# Patient Record
Sex: FEMALE | Race: BLACK OR AFRICAN AMERICAN | Employment: FULL TIME | URBAN - METROPOLITAN AREA
[De-identification: names, ages, dates, MRNs, and addresses within clinical notes are randomized per-mention and may not be internally consistent; named-entity substitution may affect disease eponyms.]

---

## 2018-04-25 ENCOUNTER — HOSPITAL ENCOUNTER (EMERGENCY)
Age: 48
Discharge: HOME | End: 2018-04-25
Attending: EMERGENCY MEDICINE

## 2018-04-25 VITALS
RESPIRATION RATE: 20 BRPM | WEIGHT: 156 LBS | TEMPERATURE: 98.2 F | SYSTOLIC BLOOD PRESSURE: 133 MMHG | HEART RATE: 81 BPM | BODY MASS INDEX: 25.96 KG/M2 | DIASTOLIC BLOOD PRESSURE: 95 MMHG | OXYGEN SATURATION: 97 %

## 2018-04-25 DIAGNOSIS — J01.90 ACUTE NON-RECURRENT SINUSITIS, UNSPECIFIED LOCATION: Primary | ICD-10-CM

## 2018-04-25 DIAGNOSIS — R05.9 COUGH: ICD-10-CM

## 2018-04-25 PROCEDURE — 99282 EMERGENCY DEPT VISIT SF MDM: CPT

## 2018-04-25 RX ORDER — AMOXICILLIN AND CLAVULANATE POTASSIUM 875; 125 MG/1; MG/1
1 TABLET, FILM COATED ORAL 2 TIMES DAILY
Qty: 14 TABLET | Refills: 0 | Status: SHIPPED | OUTPATIENT
Start: 2018-04-25 | End: 2018-05-02

## 2018-04-25 RX ORDER — ONDANSETRON 4 MG/1
4 TABLET, ORALLY DISINTEGRATING ORAL EVERY 8 HOURS PRN
Qty: 10 TABLET | Refills: 0 | Status: SHIPPED | OUTPATIENT
Start: 2018-04-25 | End: 2019-02-15 | Stop reason: ALTCHOICE

## 2018-04-25 RX ORDER — GUAIFENESIN AND CODEINE PHOSPHATE 100; 10 MG/5ML; MG/5ML
5 SOLUTION ORAL 3 TIMES DAILY PRN
Qty: 120 ML | Refills: 0 | Status: SHIPPED | OUTPATIENT
Start: 2018-04-25 | End: 2018-05-02

## 2018-04-25 RX ORDER — CETIRIZINE HYDROCHLORIDE, PSEUDOEPHEDRINE HYDROCHLORIDE 5; 120 MG/1; MG/1
1 TABLET, FILM COATED, EXTENDED RELEASE ORAL 2 TIMES DAILY
Qty: 24 TABLET | Refills: 0 | Status: SHIPPED | OUTPATIENT
Start: 2018-04-25 | End: 2018-05-25

## 2018-04-25 ASSESSMENT — PAIN DESCRIPTION - PAIN TYPE: TYPE: ACUTE PAIN

## 2018-04-25 ASSESSMENT — PAIN DESCRIPTION - ORIENTATION: ORIENTATION: ANTERIOR

## 2018-04-25 ASSESSMENT — PAIN DESCRIPTION - FREQUENCY: FREQUENCY: CONTINUOUS

## 2018-04-25 ASSESSMENT — PAIN SCALES - GENERAL: PAINLEVEL_OUTOF10: 4

## 2018-04-25 ASSESSMENT — PAIN DESCRIPTION - ONSET: ONSET: AWAKENED FROM SLEEP

## 2018-04-25 ASSESSMENT — PAIN DESCRIPTION - LOCATION: LOCATION: HEAD

## 2018-04-25 ASSESSMENT — PAIN DESCRIPTION - PROGRESSION
CLINICAL_PROGRESSION: NOT CHANGED
CLINICAL_PROGRESSION: NOT CHANGED

## 2018-04-25 ASSESSMENT — PAIN DESCRIPTION - DESCRIPTORS: DESCRIPTORS: ACHING

## 2018-04-27 ASSESSMENT — ENCOUNTER SYMPTOMS
BACK PAIN: 0
BLOOD IN STOOL: 0
ABDOMINAL PAIN: 0
COUGH: 1
VOMITING: 1
DIARRHEA: 0
SHORTNESS OF BREATH: 0
NAUSEA: 1

## 2018-08-30 ENCOUNTER — OFFICE VISIT (OUTPATIENT)
Dept: SURGERY | Age: 48
End: 2018-08-30

## 2018-08-30 VITALS
HEIGHT: 66 IN | OXYGEN SATURATION: 98 % | HEART RATE: 68 BPM | DIASTOLIC BLOOD PRESSURE: 80 MMHG | SYSTOLIC BLOOD PRESSURE: 120 MMHG | BODY MASS INDEX: 25.07 KG/M2 | WEIGHT: 156 LBS

## 2018-08-30 DIAGNOSIS — R23.8 FACIAL AGING: Primary | ICD-10-CM

## 2018-08-30 PROCEDURE — 99999 PR OFFICE/OUTPT VISIT,PROCEDURE ONLY: CPT | Performed by: PLASTIC SURGERY

## 2018-08-30 NOTE — PROGRESS NOTES
may choose to sleep in an upright position the first night after the treatment.  If an antiviral was prescribed for you, continue to take as directed.  Post treatment discomfort may be relieved by oral pain relievers; i.e. Extra Strength Tylenol.  A cold compress or an ice pack can be used to provide comfort if the treated area is especially warm. This is typically only within the first 12 hours after the treatment.  Cleanse the skin two times a day with plain, lukewarm water and a gentle cleanser; (i.e. Cetaphil) beginning the morning after the treatment. Use your hands to gently apply the cleanser and water and finish by patting dry with a soft cloth. Be careful not to rub the treated area.  After cleansing your face, reapply the occlusive barrier, (i.e. Aquaphor) taking care to cover all treated areas. The occlusive barrier is needed to provide a protective barrier that will hold moisture in the skin and provide protection to the skin from pollutants in the air as the skin heals. As a rule of thumb, the occlusive barrier is needed 1 day per 10 microns of skin or once skin has re-epithielized. For example: a 20 micron MLP = 2 days of wearing the occlusive barrier. Reapply the occlusive barrier as needed. Do not allow the treated area to dry out.  Peeling and flaking generally occur within 24 hours post treatment and should be allowed to come off naturally. DO NOT PICK, RUB, OR FORCE OFF ANY SKIN DURING THE HEALING PROCESS. THIS COULD RESULT IN SCARRING AND INFECTION! Gently washing the skin more frequently will help to promote the peeling process.  Avoid direct sunlight for up to 2 months post treatment.  Once skin has healed ( no longer wearing the occlusive barrier) you may begin to wear makeup. If you are able to wear makeup, a sunblock should be worn on a daily basis to help prevent and hyperpigmentation issues that could be caused by direct and indirect sunlight.    When showering, be sure to wash your hair behind you to avoid getting shampoo directly on the treated area.  Avoid strenuous exercise and sweating until after skin has healed. Warning: There may be some degree of swelling immediately post treatment; however, if you have excessive swelling or any of the following signs of infection, you should contact the office immediately. Signs of infection include:   Drainage-looks like pus    Increased warmth at or around the treated area   Fever of 101.5 or greater      Follow-up 2 weeks for possible laser scar revision if patient's postauricular wound is well-healed without signs of hypertrophy or keloid formation    I attest that the patient was seen and examined by me, and concur with the documentation above. I agree with the assessment and the plan outlined. This document is generated, in part, by voice recognition software and thus  syntax and grammatical errors are possible.     Fatou Ba  10:42 AM  8/30/2018

## 2019-02-15 ENCOUNTER — HOSPITAL ENCOUNTER (EMERGENCY)
Age: 49
Discharge: HOME | End: 2019-02-15
Attending: EMERGENCY MEDICINE
Payer: COMMERCIAL

## 2019-02-15 VITALS
WEIGHT: 156 LBS | OXYGEN SATURATION: 98 % | SYSTOLIC BLOOD PRESSURE: 129 MMHG | BODY MASS INDEX: 25.99 KG/M2 | RESPIRATION RATE: 18 BRPM | DIASTOLIC BLOOD PRESSURE: 89 MMHG | HEIGHT: 65 IN | TEMPERATURE: 98 F | HEART RATE: 88 BPM

## 2019-02-15 DIAGNOSIS — R11.11 NON-INTRACTABLE VOMITING WITHOUT NAUSEA, UNSPECIFIED VOMITING TYPE: ICD-10-CM

## 2019-02-15 DIAGNOSIS — J11.1 FLU: Primary | ICD-10-CM

## 2019-02-15 PROCEDURE — 99283 EMERGENCY DEPT VISIT LOW MDM: CPT

## 2019-02-15 RX ORDER — ONDANSETRON 4 MG/1
4 TABLET, ORALLY DISINTEGRATING ORAL EVERY 8 HOURS PRN
Qty: 4 TABLET | Refills: 0 | Status: SHIPPED | OUTPATIENT
Start: 2019-02-15 | End: 2019-12-30

## 2019-02-15 RX ORDER — BROMPHENIRAMINE MALEATE, PSEUDOEPHEDRINE HYDROCHLORIDE, AND DEXTROMETHORPHAN HYDROBROMIDE 2; 30; 10 MG/5ML; MG/5ML; MG/5ML
5 SYRUP ORAL 4 TIMES DAILY PRN
Qty: 120 ML | Refills: 0 | Status: SHIPPED | OUTPATIENT
Start: 2019-02-15 | End: 2019-02-25

## 2019-02-15 ASSESSMENT — ENCOUNTER SYMPTOMS
COUGH: 1
SORE THROAT: 1
EYES NEGATIVE: 1
RHINORRHEA: 1
VOMITING: 1
NAUSEA: 1

## 2019-12-30 ENCOUNTER — HOSPITAL ENCOUNTER (EMERGENCY)
Age: 49
Discharge: HOME | End: 2019-12-30
Attending: EMERGENCY MEDICINE
Payer: COMMERCIAL

## 2019-12-30 VITALS
HEIGHT: 65 IN | HEART RATE: 66 BPM | TEMPERATURE: 97.8 F | OXYGEN SATURATION: 98 % | WEIGHT: 145 LBS | RESPIRATION RATE: 16 BRPM | DIASTOLIC BLOOD PRESSURE: 91 MMHG | SYSTOLIC BLOOD PRESSURE: 129 MMHG | BODY MASS INDEX: 24.16 KG/M2

## 2019-12-30 PROCEDURE — G0381 LEV 2 HOSP TYPE B ED VISIT: HCPCS

## 2019-12-30 RX ORDER — AZITHROMYCIN 250 MG/1
TABLET, FILM COATED ORAL
Qty: 1 PACKET | Refills: 0 | Status: SHIPPED | OUTPATIENT
Start: 2019-12-30 | End: 2020-01-09

## 2019-12-30 RX ORDER — BROMPHENIRAMINE MALEATE, PSEUDOEPHEDRINE HYDROCHLORIDE, AND DEXTROMETHORPHAN HYDROBROMIDE 2; 30; 10 MG/5ML; MG/5ML; MG/5ML
5 SYRUP ORAL 4 TIMES DAILY PRN
Qty: 120 ML | Refills: 0 | Status: SHIPPED | OUTPATIENT
Start: 2019-12-30 | End: 2020-06-03

## 2019-12-30 ASSESSMENT — ENCOUNTER SYMPTOMS
SHORTNESS OF BREATH: 0
EYE REDNESS: 0
ABDOMINAL DISTENTION: 0
DIARRHEA: 0
COUGH: 1
EYE PAIN: 0
NAUSEA: 0
EYE DISCHARGE: 0
WHEEZING: 0
SORE THROAT: 0
VOMITING: 0
BACK PAIN: 0
SINUS PRESSURE: 0
RHINORRHEA: 1

## 2019-12-30 NOTE — ED PROVIDER NOTES
The history is provided by the patient. URI   Presenting symptoms: congestion, cough, fatigue and rhinorrhea    Presenting symptoms: no ear pain, no fever and no sore throat    Severity:  Moderate  Onset quality:  Gradual  Duration:  4 days  Progression:  Worsening  Chronicity:  New  Associated symptoms: no arthralgias, no headaches and no wheezing         Review of Systems   Constitutional: Positive for fatigue. Negative for chills and fever. HENT: Positive for congestion and rhinorrhea. Negative for ear pain, sinus pressure and sore throat. Eyes: Negative for pain, discharge and redness. Respiratory: Positive for cough. Negative for shortness of breath and wheezing. Cardiovascular: Negative for chest pain. Gastrointestinal: Negative for abdominal distention, diarrhea, nausea and vomiting. Genitourinary: Negative for dysuria and frequency. Musculoskeletal: Negative for arthralgias and back pain. Skin: Negative for rash and wound. Neurological: Negative for weakness and headaches. Hematological: Negative for adenopathy. All other systems reviewed and are negative. Physical Exam  Vitals signs and nursing note reviewed. Constitutional:       Appearance: She is well-developed. HENT:      Head: Normocephalic and atraumatic. Right Ear: Tympanic membrane normal.      Left Ear: Tympanic membrane normal.      Nose: Mucosal edema and congestion present. Eyes:      Pupils: Pupils are equal, round, and reactive to light. Neck:      Musculoskeletal: Normal range of motion and neck supple. Cardiovascular:      Rate and Rhythm: Normal rate and regular rhythm. Heart sounds: Normal heart sounds. No murmur. Pulmonary:      Effort: Pulmonary effort is normal. No respiratory distress. Breath sounds: Rhonchi present. No wheezing or rales. Abdominal:      General: Bowel sounds are normal.      Palpations: Abdomen is soft. Tenderness: There is no tenderness.  There is no

## 2020-06-03 ENCOUNTER — HOSPITAL ENCOUNTER (EMERGENCY)
Age: 50
Discharge: HOME | End: 2020-06-03
Attending: EMERGENCY MEDICINE
Payer: COMMERCIAL

## 2020-06-03 VITALS
DIASTOLIC BLOOD PRESSURE: 88 MMHG | TEMPERATURE: 97.2 F | RESPIRATION RATE: 16 BRPM | HEART RATE: 78 BPM | SYSTOLIC BLOOD PRESSURE: 128 MMHG | OXYGEN SATURATION: 98 % | HEIGHT: 66 IN | WEIGHT: 140 LBS | BODY MASS INDEX: 22.5 KG/M2

## 2020-06-03 PROCEDURE — 6360000002 HC RX W HCPCS: Performed by: EMERGENCY MEDICINE

## 2020-06-03 PROCEDURE — 96372 THER/PROPH/DIAG INJ SC/IM: CPT

## 2020-06-03 PROCEDURE — G0382 LEV 3 HOSP TYPE B ED VISIT: HCPCS

## 2020-06-03 RX ORDER — KETOROLAC TROMETHAMINE 30 MG/ML
30 INJECTION, SOLUTION INTRAMUSCULAR; INTRAVENOUS ONCE
Status: COMPLETED | OUTPATIENT
Start: 2020-06-03 | End: 2020-06-03

## 2020-06-03 RX ORDER — IBUPROFEN 200 MG
400 TABLET ORAL EVERY 6 HOURS PRN
COMMUNITY
End: 2020-06-03

## 2020-06-03 RX ORDER — CYCLOBENZAPRINE HCL 10 MG
10 TABLET ORAL 3 TIMES DAILY PRN
Qty: 15 TABLET | Refills: 0 | Status: SHIPPED | OUTPATIENT
Start: 2020-06-03 | End: 2020-06-08

## 2020-06-03 RX ORDER — DEXAMETHASONE SODIUM PHOSPHATE 10 MG/ML
10 INJECTION, SOLUTION INTRAMUSCULAR; INTRAVENOUS ONCE
Status: COMPLETED | OUTPATIENT
Start: 2020-06-03 | End: 2020-06-03

## 2020-06-03 RX ORDER — IBUPROFEN 600 MG/1
600 TABLET ORAL EVERY 8 HOURS PRN
Qty: 30 TABLET | Refills: 0 | Status: SHIPPED | OUTPATIENT
Start: 2020-06-03 | End: 2020-08-02

## 2020-06-03 RX ADMIN — DEXAMETHASONE SODIUM PHOSPHATE 10 MG: 10 INJECTION INTRAMUSCULAR; INTRAVENOUS at 12:22

## 2020-06-03 RX ADMIN — KETOROLAC TROMETHAMINE 30 MG: 30 INJECTION, SOLUTION INTRAMUSCULAR; INTRAVENOUS at 12:20

## 2020-06-03 ASSESSMENT — PAIN DESCRIPTION - LOCATION: LOCATION: BACK

## 2020-06-03 ASSESSMENT — ENCOUNTER SYMPTOMS
SINUS PRESSURE: 0
VOMITING: 0
SORE THROAT: 0
SHORTNESS OF BREATH: 0
WHEEZING: 0
EYE REDNESS: 0
COUGH: 0
EYE PAIN: 0
DIARRHEA: 0
BACK PAIN: 1
ABDOMINAL DISTENTION: 0
NAUSEA: 0
EYE DISCHARGE: 0

## 2020-06-03 ASSESSMENT — PAIN SCALES - GENERAL
PAINLEVEL_OUTOF10: 10
PAINLEVEL_OUTOF10: 8

## 2020-06-03 ASSESSMENT — PAIN DESCRIPTION - DESCRIPTORS: DESCRIPTORS: ACHING

## 2020-06-03 ASSESSMENT — PAIN DESCRIPTION - FREQUENCY: FREQUENCY: CONTINUOUS

## 2020-06-03 ASSESSMENT — PAIN DESCRIPTION - PAIN TYPE: TYPE: ACUTE PAIN

## 2020-06-03 ASSESSMENT — PAIN DESCRIPTION - PROGRESSION: CLINICAL_PROGRESSION: NOT CHANGED

## 2020-06-03 ASSESSMENT — PAIN DESCRIPTION - ONSET: ONSET: ON-GOING

## 2020-06-03 ASSESSMENT — PAIN DESCRIPTION - ORIENTATION: ORIENTATION: LOWER

## 2020-06-03 NOTE — ED PROVIDER NOTES
The history is provided by the patient. Back Pain   Location:  Lumbar spine  Quality:  Aching and stiffness  Radiates to:  Does not radiate  Pain severity:  Moderate  Onset quality:  Gradual  Duration:  2 weeks  Chronicity:  New  Context: lifting heavy objects and twisting    Associated symptoms: no chest pain, no dysuria, no fever, no headaches, no leg pain and no weakness         Review of Systems   Constitutional: Negative for chills and fever. HENT: Negative for ear pain, sinus pressure and sore throat. Eyes: Negative for pain, discharge and redness. Respiratory: Negative for cough, shortness of breath and wheezing. Cardiovascular: Negative for chest pain. Gastrointestinal: Negative for abdominal distention, diarrhea, nausea and vomiting. Genitourinary: Negative for dysuria and frequency. Musculoskeletal: Positive for back pain. Negative for arthralgias. Skin: Negative for rash and wound. Neurological: Negative for weakness and headaches. Hematological: Negative for adenopathy. All other systems reviewed and are negative. Physical Exam  Vitals signs and nursing note reviewed. Constitutional:       Appearance: She is well-developed. HENT:      Head: Normocephalic and atraumatic. Right Ear: Hearing and external ear normal.      Left Ear: Hearing and external ear normal.      Nose: Nose normal.      Mouth/Throat:      Pharynx: Uvula midline. Eyes:      General: Lids are normal.      Conjunctiva/sclera: Conjunctivae normal.      Pupils: Pupils are equal, round, and reactive to light. Neck:      Musculoskeletal: Normal range of motion and neck supple. Cardiovascular:      Rate and Rhythm: Normal rate and regular rhythm. Heart sounds: Normal heart sounds. No murmur. Pulmonary:      Effort: Pulmonary effort is normal. No respiratory distress. Breath sounds: Normal breath sounds. No wheezing or rales.    Abdominal:      General: Bowel sounds are normal.

## 2020-08-02 ENCOUNTER — HOSPITAL ENCOUNTER (EMERGENCY)
Age: 50
Discharge: HOME | End: 2020-08-02
Attending: EMERGENCY MEDICINE
Payer: COMMERCIAL

## 2020-08-02 VITALS
HEART RATE: 78 BPM | SYSTOLIC BLOOD PRESSURE: 135 MMHG | DIASTOLIC BLOOD PRESSURE: 88 MMHG | TEMPERATURE: 97.3 F | RESPIRATION RATE: 14 BRPM | OXYGEN SATURATION: 98 %

## 2020-08-02 PROCEDURE — G0381 LEV 2 HOSP TYPE B ED VISIT: HCPCS

## 2020-08-02 RX ORDER — CLINDAMYCIN HYDROCHLORIDE 150 MG/1
150 CAPSULE ORAL 4 TIMES DAILY
Qty: 40 CAPSULE | Refills: 0 | Status: SHIPPED | OUTPATIENT
Start: 2020-08-02 | End: 2020-08-12

## 2020-08-02 RX ORDER — IBUPROFEN 600 MG/1
600 TABLET ORAL EVERY 8 HOURS PRN
Qty: 30 TABLET | Refills: 0 | Status: SHIPPED | OUTPATIENT
Start: 2020-08-02 | End: 2020-08-12

## 2020-08-02 ASSESSMENT — ENCOUNTER SYMPTOMS
DIARRHEA: 0
SINUS PRESSURE: 0
BACK PAIN: 0
ABDOMINAL DISTENTION: 0
EYE DISCHARGE: 0
NAUSEA: 0
VOMITING: 0
WHEEZING: 0
EYE PAIN: 0
SHORTNESS OF BREATH: 0
COUGH: 0
EYE REDNESS: 0
SORE THROAT: 0

## 2020-08-02 ASSESSMENT — PAIN - FUNCTIONAL ASSESSMENT: PAIN_FUNCTIONAL_ASSESSMENT: ACTIVITIES ARE NOT PREVENTED

## 2020-08-02 ASSESSMENT — PAIN SCALES - GENERAL: PAINLEVEL_OUTOF10: 8

## 2020-08-02 ASSESSMENT — PAIN DESCRIPTION - PAIN TYPE: TYPE: ACUTE PAIN

## 2020-08-02 ASSESSMENT — PAIN DESCRIPTION - DESCRIPTORS: DESCRIPTORS: ACHING

## 2020-08-02 ASSESSMENT — PAIN DESCRIPTION - PROGRESSION
CLINICAL_PROGRESSION: NOT CHANGED
CLINICAL_PROGRESSION: NOT CHANGED

## 2020-08-02 ASSESSMENT — PAIN DESCRIPTION - FREQUENCY: FREQUENCY: CONTINUOUS

## 2020-08-02 ASSESSMENT — PAIN DESCRIPTION - LOCATION: LOCATION: MOUTH

## 2020-08-02 ASSESSMENT — PAIN DESCRIPTION - ORIENTATION: ORIENTATION: RIGHT;LOWER

## 2020-08-02 NOTE — ED PROVIDER NOTES
The history is provided by the patient. Dental Pain   Location:  Lower  Lower teeth location:  28/RL 1st bicuspid, 29/RL 2nd bicuspid and 30/RL 1st molar  Quality:  Aching  Severity:  Moderate  Onset quality:  Gradual  Duration:  1 week  Chronicity:  New  Context: abscess    Associated symptoms: no fever and no headaches         Review of Systems   Constitutional: Negative for chills and fever. HENT: Negative for ear pain, sinus pressure and sore throat. Eyes: Negative for pain, discharge and redness. Respiratory: Negative for cough, shortness of breath and wheezing. Cardiovascular: Negative for chest pain. Gastrointestinal: Negative for abdominal distention, diarrhea, nausea and vomiting. Genitourinary: Negative for dysuria and frequency. Musculoskeletal: Negative for arthralgias and back pain. Skin: Negative for rash and wound. Neurological: Negative for weakness and headaches. Hematological: Negative for adenopathy. All other systems reviewed and are negative. Physical Exam  Vitals signs and nursing note reviewed. Constitutional:       Appearance: She is well-developed. HENT:      Head: Normocephalic and atraumatic. Right Ear: Tympanic membrane normal.      Left Ear: Tympanic membrane normal.      Nose: Nose normal.      Mouth/Throat:      Dentition: Gingival swelling, dental caries and dental abscesses present. Eyes:      Pupils: Pupils are equal, round, and reactive to light. Neck:      Musculoskeletal: Normal range of motion and neck supple. Cardiovascular:      Rate and Rhythm: Normal rate and regular rhythm. Heart sounds: Normal heart sounds. No murmur. Pulmonary:      Effort: Pulmonary effort is normal. No respiratory distress. Breath sounds: Normal breath sounds. No wheezing or rales. Abdominal:      General: Bowel sounds are normal.      Palpations: Abdomen is soft. Tenderness: There is no abdominal tenderness.  There is no guarding or rebound. Skin:     General: Skin is warm and dry. Neurological:      Mental Status: She is alert and oriented to person, place, and time. Cranial Nerves: No cranial nerve deficit. Coordination: Coordination normal.          Procedures     [facility]          --------------------------------------------- PAST HISTORY ---------------------------------------------  Past Medical History:  has no past medical history on file. Past Surgical History:  has a past surgical history that includes  section. Social History:  reports that she quit smoking about 5 years ago. Her smoking use included cigarettes. She has a 10.00 pack-year smoking history. She has never used smokeless tobacco. She reports that she does not drink alcohol or use drugs. Family History: family history includes Cancer in her mother; High Blood Pressure in her father. The patients home medications have been reviewed. Allergies: Codeine    -------------------------------------------------- RESULTS -------------------------------------------------  Labs:  No results found for this visit on 20. Radiology:  No orders to display       ------------------------- NURSING NOTES AND VITALS REVIEWED ---------------------------  Date / Time Roomed:  2020  4:17 PM  ED Bed Assignment:      The nursing notes within the ED encounter and vital signs as below have been reviewed. /88   Pulse 78   Temp 97.3 °F (36.3 °C) (Infrared)   Resp 14   LMP 2016   SpO2 98%   Oxygen Saturation Interpretation: Normal      ------------------------------------------ PROGRESS NOTES ------------------------------------------  I have spoken with the patient and discussed todays results, in addition to providing specific details for the plan of care and counseling regarding the diagnosis and prognosis. Their questions are answered at this time and they are agreeable with the plan.  I discussed at length with them reasons for immediate return here for re evaluation. They will followup with primary care by calling their office tomorrow. --------------------------------- ADDITIONAL PROVIDER NOTES ---------------------------------  At this time the patient is without objective evidence of an acute process requiring hospitalization or inpatient management. They have remained hemodynamically stable throughout their entire ED visit and are stable for discharge with outpatient follow-up. The plan has been discussed in detail and they are aware of the specific conditions for emergent return, as well as the importance of follow-up. New Prescriptions    CLINDAMYCIN (CLEOCIN) 150 MG CAPSULE    Take 1 capsule by mouth 4 times daily for 10 days    IBUPROFEN (ADVIL;MOTRIN) 600 MG TABLET    Take 1 tablet by mouth every 8 hours as needed for Pain       Diagnosis:  1. Dental abscess        Disposition:  Patient's disposition: Discharge to home  Patient's condition is stable.                       Jeovanny Rangel MD  08/02/20 [id number]

## 2024-08-05 ENCOUNTER — HOSPITAL ENCOUNTER (EMERGENCY)
Age: 54
Discharge: HOME | End: 2024-08-05
Payer: COMMERCIAL

## 2024-08-05 VITALS
WEIGHT: 163 LBS | OXYGEN SATURATION: 97 % | RESPIRATION RATE: 20 BRPM | SYSTOLIC BLOOD PRESSURE: 149 MMHG | HEART RATE: 80 BPM | DIASTOLIC BLOOD PRESSURE: 93 MMHG | TEMPERATURE: 97.4 F | BODY MASS INDEX: 26.31 KG/M2

## 2024-08-05 DIAGNOSIS — H60.392 INFECTIVE OTITIS EXTERNA, LEFT: Primary | ICD-10-CM

## 2024-08-05 PROCEDURE — 99211 OFF/OP EST MAY X REQ PHY/QHP: CPT

## 2024-08-05 RX ORDER — OFLOXACIN 3 MG/ML
5 SOLUTION AURICULAR (OTIC) 2 TIMES DAILY
Qty: 5 ML | Refills: 0 | Status: SHIPPED | OUTPATIENT
Start: 2024-08-05 | End: 2024-08-15

## 2024-08-05 NOTE — ED PROVIDER NOTES
[facility]  Department of Emergency Medicine     Encounter Note  Admit Date/RoomTime: 2024  5:10 PM   Room:     NAME: Michelle Angel  : 1970  MRN: [id number]     Chief Complaint:  Ear Fullness (Left ear clogged and [place] out of it. )    History of Present Illness        Michelle Angel is a 54 y.o. old female who presenting to the urgent care with complaint of hide a several day history of left ear fullness with decreased hearing in the left ear.  Patient reports that she tried some earwax removal drops, she also states that she stopped and she is to have down her ear several days ago and feels like she probably went too far with it and is having increased pain in the area where she scraped her ear.  She denies any fevers, drainage from the ear, denies any mastoid tenderness.  Reports without she is having some decreased hearing out of this ear.    ROS   Pertinent positives and negatives are stated within HPI, all other systems reviewed and are negative.    Past Medical History:  has no past medical history on file.    Surgical History:  has a past surgical history that includes  section.    Social History:  reports that she quit smoking about 9 years ago. Her smoking use included cigarettes. She started smoking about 19 years ago. She has a 10.0 pack-year smoking history. She has never used smokeless tobacco. She reports that she does not drink alcohol and does not use drugs.    Family History: family history includes Cancer in her mother; High Blood Pressure in her father.     Allergies: Codeine    Physical Exam   Oxygen Saturation Interpretation: Normal.        ED Triage Vitals [24 1712]   BP Temp Temp src Pulse Resp SpO2 Height Weight   -- 97.4 °F (36.3 °C) -- -- -- -- -- --         Constitutional:  Alert, development consistent with age.  Ears:  External Ears: Bilateral normal.                 TM's & External Canals: Right canal is erythematous and edematous, unable to

## 2024-08-05 NOTE — DISCHARGE INSTRUCTIONS
Floxin eardrops 5 drops to the left ear twice daily for 10 days.  If symptoms do not improve please follow-up with ear nose and throat.  If you develop any worsening pain, swelling, pain behind your ear please go to the ER.  Keep your ears as dry as possible.

## 2024-08-08 ENCOUNTER — TELEPHONE (OUTPATIENT)
Dept: ENT CLINIC | Age: 54
End: 2024-08-08

## 2024-08-08 NOTE — TELEPHONE ENCOUNTER
Pt called to schedule a ED follow up-08/05 [facility], Infective otitis externa, left. No availability at this time due to pt care. Please contact.

## 2024-08-16 ENCOUNTER — OFFICE VISIT (OUTPATIENT)
Dept: ENT CLINIC | Age: 54
End: 2024-08-16

## 2024-08-16 VITALS
BODY MASS INDEX: 26.15 KG/M2 | SYSTOLIC BLOOD PRESSURE: 134 MMHG | HEART RATE: 62 BPM | DIASTOLIC BLOOD PRESSURE: 86 MMHG | WEIGHT: 162 LBS

## 2024-08-16 DIAGNOSIS — H61.23 HEARING LOSS OF BOTH EARS DUE TO CERUMEN IMPACTION: Primary | ICD-10-CM

## 2024-08-16 NOTE — PROGRESS NOTES
[facility]  HILDA SteinO. Ms.Ed.  New Consult       Patient Name:  Michelle Angel  :  1970     CHIEF C/O:    Chief Complaint   Patient presents with    New Patient     Pt states she cannot hear out of left ear, onset about couple of weeks now.        HISTORY OBTAINED FROM:  patient    HISTORY OF PRESENT ILLNESS:       Michelle is a 54 y.o. year old female, here today for:       Placed on ear drops fort left sided hearing loss for about two weeks. Feels more gradual no hear test to thisnpiont. No history of ear surgery.          No past medical history on file.  Past Surgical History:   Procedure Laterality Date     SECTION         Current Outpatient Medications:     ibuprofen (ADVIL;MOTRIN) 600 MG tablet, Take 1 tablet by mouth every 8 hours as needed for Pain, Disp: 30 tablet, Rfl: 0  Codeine  Social History     Tobacco Use    Smoking status: Former     Current packs/day: 0.00     Average packs/day: 1 pack/day for 10.0 years (10.0 ttl pk-yrs)     Types: Cigarettes     Start date: 2005     Quit date: 2015     Years since quittin.6    Smokeless tobacco: Never   Substance Use Topics    Alcohol use: No    Drug use: No     Family History   Problem Relation Age of Onset    Cancer Mother         ovarian    High Blood Pressure Father        Review of Systems   Constitutional:  Negative for chills and fever.   HENT:  Positive for ear pain and hearing loss. Negative for ear discharge, nosebleeds, sinus pain, sneezing and sore throat.    Respiratory:  Negative for cough and shortness of breath.    Cardiovascular:  Negative for chest pain and palpitations.   Gastrointestinal:  Negative for vomiting.   Skin:  Negative for rash.   Allergic/Immunologic: Negative for environmental allergies.   Neurological:  Negative for dizziness and headaches.   Hematological:  Does not bruise/bleed easily.   All other systems reviewed and are negative.      /86 (Site: Left Upper Arm,

## 2024-08-23 ASSESSMENT — ENCOUNTER SYMPTOMS
VOMITING: 0
SINUS PAIN: 0
SORE THROAT: 0
SHORTNESS OF BREATH: 0
COUGH: 0

## 2024-09-13 ENCOUNTER — TELEPHONE (OUTPATIENT)
Dept: ENT CLINIC | Age: 54
End: 2024-09-13

## 2024-09-18 ENCOUNTER — OFFICE VISIT (OUTPATIENT)
Dept: ENT CLINIC | Age: 54
End: 2024-09-18
Payer: COMMERCIAL

## 2024-09-18 VITALS
BODY MASS INDEX: 26.62 KG/M2 | WEIGHT: 164.9 LBS | HEART RATE: 64 BPM | DIASTOLIC BLOOD PRESSURE: 84 MMHG | SYSTOLIC BLOOD PRESSURE: 141 MMHG

## 2024-09-18 DIAGNOSIS — B36.9 OTOMYCOSIS OF LEFT EAR: ICD-10-CM

## 2024-09-18 DIAGNOSIS — H62.42 OTOMYCOSIS OF LEFT EAR: ICD-10-CM

## 2024-09-18 DIAGNOSIS — H61.23 HEARING LOSS OF BOTH EARS DUE TO CERUMEN IMPACTION: Primary | ICD-10-CM

## 2024-09-18 PROCEDURE — 99213 OFFICE O/P EST LOW 20 MIN: CPT | Performed by: OTOLARYNGOLOGY

## 2024-09-18 RX ORDER — CLOTRIMAZOLE AND BETAMETHASONE DIPROPIONATE 10; .64 MG/G; MG/G
CREAM TOPICAL
Qty: 1 EACH | Refills: 0 | Status: SHIPPED | OUTPATIENT
Start: 2024-09-18

## 2024-09-18 NOTE — PROGRESS NOTES
[facility]  HILDA SteinO. Ms.Ed        Patient Name:  Michelle Angel  :  1970     CHIEF C/O:    Chief Complaint   Patient presents with    Follow-up     Follow up Infective otitis externa, left. States she was feeling better however over the last week she has developed intermittent left ear pain, irritation again. Denies any fevers.       HISTORY OBTAINED FROM:  patient    HISTORY OF PRESENT ILLNESS:       Michelle is a 54 y.o. year old female, here today for follow up of:       Patient here for left otitis externa, pt was doing a little better and now right ear is achy on and off,          History reviewed. No pertinent past medical history.  Past Surgical History:   Procedure Laterality Date     SECTION         Current Outpatient Medications:     ibuprofen (ADVIL;MOTRIN) 600 MG tablet, Take 1 tablet by mouth every 8 hours as needed for Pain, Disp: 30 tablet, Rfl: 0  Codeine  Social History     Tobacco Use    Smoking status: Former     Current packs/day: 0.00     Average packs/day: 1 pack/day for 10.0 years (10.0 ttl pk-yrs)     Types: Cigarettes     Start date: 2005     Quit date: 2015     Years since quittin.7    Smokeless tobacco: Never   Substance Use Topics    Alcohol use: No    Drug use: No     Family History   Problem Relation Age of Onset    Cancer Mother         ovarian    High Blood Pressure Father        Review of Systems   Constitutional:  Negative for chills and fever.   HENT:  Positive for ear pain (achy). Negative for congestion, ear discharge, hearing loss, postnasal drip, rhinorrhea, sore throat, tinnitus and voice change.    Respiratory:  Negative for cough and shortness of breath.    Cardiovascular:  Negative for chest pain and palpitations.   Gastrointestinal:  Negative for vomiting.   Skin:  Negative for rash.   Allergic/Immunologic: Negative for environmental allergies.   Neurological:  Negative for dizziness and headaches.   Hematological: